# Patient Record
Sex: FEMALE | Race: WHITE | NOT HISPANIC OR LATINO
[De-identification: names, ages, dates, MRNs, and addresses within clinical notes are randomized per-mention and may not be internally consistent; named-entity substitution may affect disease eponyms.]

---

## 2019-09-23 ENCOUNTER — TRANSCRIPTION ENCOUNTER (OUTPATIENT)
Age: 64
End: 2019-09-23

## 2019-09-23 ENCOUNTER — APPOINTMENT (OUTPATIENT)
Dept: CARDIOLOGY | Facility: CLINIC | Age: 64
End: 2019-09-23
Payer: MEDICARE

## 2019-09-23 ENCOUNTER — NON-APPOINTMENT (OUTPATIENT)
Age: 64
End: 2019-09-23

## 2019-09-23 VITALS
WEIGHT: 172 LBS | SYSTOLIC BLOOD PRESSURE: 112 MMHG | HEIGHT: 64 IN | DIASTOLIC BLOOD PRESSURE: 77 MMHG | HEART RATE: 67 BPM | OXYGEN SATURATION: 98 % | BODY MASS INDEX: 29.37 KG/M2

## 2019-09-23 DIAGNOSIS — Z80.0 FAMILY HISTORY OF MALIGNANT NEOPLASM OF DIGESTIVE ORGANS: ICD-10-CM

## 2019-09-23 DIAGNOSIS — E03.9 HYPOTHYROIDISM, UNSPECIFIED: ICD-10-CM

## 2019-09-23 DIAGNOSIS — Z82.49 FAMILY HISTORY OF ISCHEMIC HEART DISEASE AND OTHER DISEASES OF THE CIRCULATORY SYSTEM: ICD-10-CM

## 2019-09-23 DIAGNOSIS — Z80.7 FAMILY HISTORY OF OTHER MALIGNANT NEOPLASMS OF LYMPHOID, HEMATOPOIETIC AND RELATED TISSUES: ICD-10-CM

## 2019-09-23 PROBLEM — Z00.00 ENCOUNTER FOR PREVENTIVE HEALTH EXAMINATION: Status: ACTIVE | Noted: 2019-09-23

## 2019-09-23 PROCEDURE — 99204 OFFICE O/P NEW MOD 45 MIN: CPT

## 2019-09-23 PROCEDURE — 93000 ELECTROCARDIOGRAM COMPLETE: CPT

## 2019-09-23 RX ORDER — TRIAMTERENE AND HYDROCHLOROTHIAZIDE 37.5; 25 MG/1; MG/1
37.5-25 CAPSULE ORAL
Refills: 0 | Status: ACTIVE | COMMUNITY

## 2019-09-23 NOTE — DISCUSSION/SUMMARY
[FreeTextEntry1] : Unfortunately, her blood work is unavailable. I had a long conversation with her about the impact of abnormalities in the following risk factors in determining cardiovascular risk. Her risk is elevated currently on the basis of classic risk factors, so it is not clear that we need to put too much emphasis on the results of the evolving respecter is, that may help us decide how aggressive to be in terms of aspirin therapy. Once I have had a chance to review all of her laboratory results, I will make additional comments regarding therapy. I suggested that it is likely that with uncontrolled LDL cholesterol, we will change her to atorvastatin 40 mg daily, hoping to achieve a better LDL cholesterol.\par \par In an attempt at determining more accurately her overall cardiovascular risk, I suggested the following examinations. Noting that she does not exercise, and as such the lack of symptoms is not reassuring, I have suggested a regular stress test. Hypertension, she will schedule an echocardiogram. To assess her overall burden of atherosclerosis, I have suggested a carotid ultrasound as well as a calcium score. She will schedule these, and I will be in contact with her to discuss the results. As soon as I have a chance to review her blood work results, I will call her and we will make a decision about the merits of more powerful lipid lowering therapy.

## 2019-09-23 NOTE — HISTORY OF PRESENT ILLNESS
[FreeTextEntry1] : Jannet presented to the office today for a cardiovascular evaluation. She presents for further evaluation of her cardiovascular risk factors.\par \par She is a 64-year-old woman with a history of hypertension and hyperlipidemia. There is a family history of early atherosclerosis, noting that she has a brother who had an MI at around the age of 55. She herself does not have any known structural heart disease, but has been undergoing treatment with a diuretic and a statin for years. At baseline, she has been sedentary. With day-to-day activities, she has felt well, without reproducible chest discomfort or shortness of breath that would be suggestive of angina. She denies orthopnea, PND and lower extremity edema. She denies palpitations, dizziness and syncope. She recently had blood work, which is unavailable. In addition to her LDL cholesterol, which has been in perfectly controlled by report, there was another blood test, listed as an emergent risk factor, which was also significantly abnormal. It is for the further evaluation of this, that she presents to the office for evaluation.\par

## 2019-09-28 ENCOUNTER — APPOINTMENT (OUTPATIENT)
Dept: CARDIOLOGY | Facility: CLINIC | Age: 64
End: 2019-09-28
Payer: MEDICARE

## 2019-09-28 PROCEDURE — 93880 EXTRACRANIAL BILAT STUDY: CPT

## 2019-10-10 ENCOUNTER — APPOINTMENT (OUTPATIENT)
Dept: CARDIOLOGY | Facility: CLINIC | Age: 64
End: 2019-10-10
Payer: MEDICARE

## 2019-10-10 PROCEDURE — 93306 TTE W/DOPPLER COMPLETE: CPT

## 2019-10-21 ENCOUNTER — APPOINTMENT (OUTPATIENT)
Dept: CARDIOLOGY | Facility: CLINIC | Age: 64
End: 2019-10-21
Payer: MEDICARE

## 2019-10-21 PROCEDURE — 93015 CV STRESS TEST SUPVJ I&R: CPT

## 2020-01-04 ENCOUNTER — TRANSCRIPTION ENCOUNTER (OUTPATIENT)
Age: 65
End: 2020-01-04

## 2020-10-08 ENCOUNTER — APPOINTMENT (OUTPATIENT)
Dept: OTOLARYNGOLOGY | Facility: CLINIC | Age: 65
End: 2020-10-08
Payer: MEDICARE

## 2020-10-08 VITALS
TEMPERATURE: 97.9 F | HEART RATE: 85 BPM | HEIGHT: 64 IN | WEIGHT: 180 LBS | BODY MASS INDEX: 30.73 KG/M2 | DIASTOLIC BLOOD PRESSURE: 78 MMHG | SYSTOLIC BLOOD PRESSURE: 117 MMHG

## 2020-10-08 DIAGNOSIS — Z86.79 PERSONAL HISTORY OF OTHER DISEASES OF THE CIRCULATORY SYSTEM: ICD-10-CM

## 2020-10-08 DIAGNOSIS — Z86.39 PERSONAL HISTORY OF OTHER ENDOCRINE, NUTRITIONAL AND METABOLIC DISEASE: ICD-10-CM

## 2020-10-08 DIAGNOSIS — Z78.9 OTHER SPECIFIED HEALTH STATUS: ICD-10-CM

## 2020-10-08 DIAGNOSIS — H61.21 IMPACTED CERUMEN, RIGHT EAR: ICD-10-CM

## 2020-10-08 PROCEDURE — 99203 OFFICE O/P NEW LOW 30 MIN: CPT | Mod: 25

## 2020-10-08 PROCEDURE — 69210 REMOVE IMPACTED EAR WAX UNI: CPT

## 2020-10-08 NOTE — HISTORY OF PRESENT ILLNESS
[de-identified] : 65 yr old female was told by her dr that she had CI AD 3-4 weeks ago.  Tried OTC drops, H2O2, still clogged.  +otalgia AD a few days ago, better now.\par +Qtips\par -tinnitus, dizzy\par +childhood otitis\par -noise exp, head trauma\par +FH father +NIHL

## 2020-10-08 NOTE — CONSULT LETTER
[Dear  ___] : Dear  [unfilled], [Please see my note below.] : Please see my note below. [Consult Closing:] : Thank you very much for allowing me to participate in the care of this patient.  If you have any questions, please do not hesitate to contact me. [Sincerely,] : Sincerely, [FreeTextEntry3] : Micah Verma M.D.\par

## 2020-10-08 NOTE — PHYSICAL EXAM
[de-identified] : CI AD [Normal] : mucosa is normal [Midline] : trachea located in midline position

## 2020-10-08 NOTE — REVIEW OF SYSTEMS
[Sneezing] : sneezing [Seasonal Allergies] : seasonal allergies [Post Nasal Drip] : post nasal drip [Ear Pain] : ear pain [Ear Itch] : ear itch [Nasal Congestion] : nasal congestion [Sinus Pain] : sinus pain [Sinus Pressure] : sinus pressure [Discharge From Eyes] : purulent discharge from the eyes [Eyes Itch] : itching of the eyes [Negative] : Heme/Lymph [As Noted in HPI] : as noted in HPI [FreeTextEntry3] : clear

## 2022-06-15 ENCOUNTER — NON-APPOINTMENT (OUTPATIENT)
Age: 67
End: 2022-06-15

## 2022-07-05 ENCOUNTER — NON-APPOINTMENT (OUTPATIENT)
Age: 67
End: 2022-07-05

## 2022-07-05 ENCOUNTER — APPOINTMENT (OUTPATIENT)
Dept: CARDIOLOGY | Facility: CLINIC | Age: 67
End: 2022-07-05

## 2022-07-05 VITALS
OXYGEN SATURATION: 97 % | SYSTOLIC BLOOD PRESSURE: 115 MMHG | DIASTOLIC BLOOD PRESSURE: 74 MMHG | BODY MASS INDEX: 28.68 KG/M2 | HEART RATE: 63 BPM | WEIGHT: 168 LBS | HEIGHT: 64 IN

## 2022-07-05 PROCEDURE — 93000 ELECTROCARDIOGRAM COMPLETE: CPT

## 2022-07-05 PROCEDURE — 99214 OFFICE O/P EST MOD 30 MIN: CPT

## 2022-07-05 RX ORDER — SIMVASTATIN 40 MG/1
40 TABLET, FILM COATED ORAL
Refills: 0 | Status: DISCONTINUED | COMMUNITY
End: 2022-07-05

## 2022-07-05 RX ORDER — LEVOTHYROXINE SODIUM 125 UG/1
125 TABLET ORAL
Qty: 90 | Refills: 0 | Status: ACTIVE | COMMUNITY
Start: 2022-06-23

## 2022-07-05 RX ORDER — LEVOTHYROXINE SODIUM 137 UG/1
137 TABLET ORAL
Refills: 0 | Status: DISCONTINUED | COMMUNITY
End: 2022-07-05

## 2022-07-05 NOTE — DISCUSSION/SUMMARY
[FreeTextEntry1] : Overall, she seems to be a relatively low risk individual.  We had an extensive conversation about the ramifications of her myeloperoxidase level being elevated.  In context, she is a low risk individual, despite her family history.  Her calcium score is 0, and her lipid profile is as measured in a standard fashion is all very well controlled.\par \par I do not like simvastatin 60 mg given the risk of side effects, and I will change her to atorvastatin 40 mg which is a safer approach.  She will have blood work done in about 6 weeks with her primary care physician.\par \par I would like to see her again in about a year to reevaluate her extensive lipid profile.  We will further discuss her overall cardiovascular risk at that time.

## 2022-07-05 NOTE — HISTORY OF PRESENT ILLNESS
[FreeTextEntry1] : Jannet presented to the office today for a cardiovascular evaluation.  She was last seen in the office in September 2019, for the further evaluation of her risk factors.\par \par She is now 67 years old, with a history of hypertension and hyperlipidemia. There is a family history of early atherosclerosis, noting that she has a brother who had an MI at around the age of 55. She herself does not have any known structural heart disease, but has been undergoing treatment with a diuretic and a statin for years. \par \par I saw her in the office in 2019, at which time she was feeling well.  She was concerned about her overall cardiovascular risk, and we elected to pursue several noninvasive evaluations.  She had a carotid ultrasound, an echocardiogram and a stress test.  The results of these were all favorable.  Her carotid ultrasound revealed minimal atherosclerotic changes.  Her echocardiogram revealed a normal ejection fraction, without significant valvular disease.  Her stress test revealed no evidence of ischemia.  For further evaluation she also went on to have a coronary calcium score in October 2019.  Her calcium score was 0.  She was reassured.\par \par At baseline, she has remained sedentary. With day-to-day activities, she has felt well, without reproducible chest discomfort or shortness of breath that would be suggestive of angina. She denies orthopnea, PND and lower extremity edema. She denies palpitations, dizziness and syncope.  She has an extensive lipid profile including involving risk factor levels.  This revealed that all of her levels were well controlled, other than myeloperoxidase, which has been historically elevated for her.  On the basis of this, she was told to increase her dose of simvastatin from 40 mg up to 60 mg daily.

## 2022-07-28 ENCOUNTER — APPOINTMENT (OUTPATIENT)
Dept: OTOLARYNGOLOGY | Facility: CLINIC | Age: 67
End: 2022-07-28

## 2022-07-28 VITALS — HEIGHT: 64 IN | BODY MASS INDEX: 28.68 KG/M2 | WEIGHT: 168 LBS

## 2022-07-28 VITALS — HEART RATE: 60 BPM | SYSTOLIC BLOOD PRESSURE: 140 MMHG | DIASTOLIC BLOOD PRESSURE: 88 MMHG

## 2022-07-28 VITALS
BODY MASS INDEX: 28.88 KG/M2 | DIASTOLIC BLOOD PRESSURE: 86 MMHG | SYSTOLIC BLOOD PRESSURE: 136 MMHG | HEART RATE: 54 BPM | HEIGHT: 63 IN | WEIGHT: 163 LBS

## 2022-07-28 DIAGNOSIS — H93.291 OTHER ABNORMAL AUDITORY PERCEPTIONS, RIGHT EAR: ICD-10-CM

## 2022-07-28 PROCEDURE — 92570 ACOUSTIC IMMITANCE TESTING: CPT

## 2022-07-28 PROCEDURE — 99214 OFFICE O/P EST MOD 30 MIN: CPT

## 2022-07-28 PROCEDURE — 92557 COMPREHENSIVE HEARING TEST: CPT

## 2022-07-28 NOTE — DATA REVIEWED
[de-identified] : \par -TYMPS: TYPE C AD, TYPE A AS\par -AD: HEARING -2000 HZ, ESSENTIALLY MILD CHL 5307-4965 HZ\par -AS: HEARING ESSENTIALLY WITHIN/ BORDERING NORMAL LIMITS 250-8000 HZ, MILD  SNHL NOTED AT 3KHZ\par RECS: 1) ENT F/U 2)RE-EVAL IN CONJUNCTION W/ MEDICAL MANAGEMENT

## 2022-07-28 NOTE — HISTORY OF PRESENT ILLNESS
[de-identified] : 67 yr old female failed screening audio AD at her internist, aware of some loss AD\par -tinnitus, dizzy\par \par +hx otitis, FH NIHL\par -noise exp, head trauma

## 2022-07-28 NOTE — REVIEW OF SYSTEMS
[Seasonal Allergies] : seasonal allergies [As Noted in HPI] : as noted in HPI [Negative] : Head and Neck Airway patent, nasal congestion, mouth with moist mucosa. Throat has no vesicles, no oropharyngeal exudates and uvula is midline. Clear tympanic membranes bilaterally. Airway patent, +nasal congestion, mouth with moist mucosa. Throat has no vesicles, no oropharyngeal exudates and uvula is midline. Clear tympanic membranes bilaterally.

## 2022-07-28 NOTE — ASSESSMENT
[FreeTextEntry1] :   AS WNL w mild SNHL 3KHZ w type A, AD WNL to mild SNHL 3-8KHz 2 type C\par ABR\par f/u after testing is complete

## 2022-08-02 ENCOUNTER — NON-APPOINTMENT (OUTPATIENT)
Age: 67
End: 2022-08-02

## 2022-08-02 ENCOUNTER — APPOINTMENT (OUTPATIENT)
Dept: OTOLARYNGOLOGY | Facility: CLINIC | Age: 67
End: 2022-08-02

## 2022-08-02 PROCEDURE — 92653 AEP NEURODIAGNOSTIC I&R: CPT

## 2022-08-04 ENCOUNTER — APPOINTMENT (OUTPATIENT)
Dept: OTOLARYNGOLOGY | Facility: CLINIC | Age: 67
End: 2022-08-04

## 2022-08-04 VITALS
HEIGHT: 63 IN | HEART RATE: 58 BPM | SYSTOLIC BLOOD PRESSURE: 110 MMHG | DIASTOLIC BLOOD PRESSURE: 68 MMHG | WEIGHT: 163 LBS | BODY MASS INDEX: 28.88 KG/M2

## 2022-08-04 PROCEDURE — 99213 OFFICE O/P EST LOW 20 MIN: CPT

## 2022-08-04 NOTE — HISTORY OF PRESENT ILLNESS
[de-identified] : 67 yr old female failed screening audio AD at her internist, aware of some loss AD\par -tinnitus, dizzy\par \par +hx otitis, FH NIHL\par -noise exp, head trauma\par \par comes in today for results of ABR

## 2022-11-07 ENCOUNTER — APPOINTMENT (OUTPATIENT)
Dept: OTOLARYNGOLOGY | Facility: CLINIC | Age: 67
End: 2022-11-07

## 2022-11-07 VITALS
SYSTOLIC BLOOD PRESSURE: 131 MMHG | HEART RATE: 58 BPM | BODY MASS INDEX: 29.77 KG/M2 | WEIGHT: 168 LBS | HEIGHT: 63 IN | DIASTOLIC BLOOD PRESSURE: 85 MMHG

## 2022-11-07 DIAGNOSIS — H69.81 OTHER SPECIFIED DISORDERS OF EUSTACHIAN TUBE, RIGHT EAR: ICD-10-CM

## 2022-11-07 PROCEDURE — 99213 OFFICE O/P EST LOW 20 MIN: CPT

## 2022-11-07 PROCEDURE — 92570 ACOUSTIC IMMITANCE TESTING: CPT

## 2022-11-07 PROCEDURE — 92557 COMPREHENSIVE HEARING TEST: CPT

## 2022-11-07 RX ORDER — AZITHROMYCIN 250 MG/1
250 TABLET, FILM COATED ORAL
Qty: 6 | Refills: 0 | Status: DISCONTINUED | COMMUNITY
Start: 2022-08-02

## 2022-11-07 NOTE — DATA REVIEWED
[de-identified] : Type C tymp AD, type A AS\par AD- WNL to mild -8000 Hz, AS- Borderline -8000 Hz\par

## 2022-11-07 NOTE — ASSESSMENT
[FreeTextEntry1] :   AD WNL to mild MHL 2-8KHz w type A, AS borderline WNL.  Stable as compared to 2022\par f/u 6 months

## 2022-11-21 ENCOUNTER — OUTPATIENT (OUTPATIENT)
Dept: OUTPATIENT SERVICES | Facility: HOSPITAL | Age: 67
LOS: 1 days | End: 2022-11-21
Payer: MEDICARE

## 2022-11-21 ENCOUNTER — APPOINTMENT (OUTPATIENT)
Dept: CT IMAGING | Facility: CLINIC | Age: 67
End: 2022-11-21

## 2022-11-21 DIAGNOSIS — Z00.8 ENCOUNTER FOR OTHER GENERAL EXAMINATION: ICD-10-CM

## 2022-11-21 PROCEDURE — 74177 CT ABD & PELVIS W/CONTRAST: CPT | Mod: MH

## 2022-11-21 PROCEDURE — 74177 CT ABD & PELVIS W/CONTRAST: CPT | Mod: 26,MH

## 2022-12-08 ENCOUNTER — APPOINTMENT (OUTPATIENT)
Dept: MRI IMAGING | Facility: CLINIC | Age: 67
End: 2022-12-08

## 2022-12-08 ENCOUNTER — OUTPATIENT (OUTPATIENT)
Dept: OUTPATIENT SERVICES | Facility: HOSPITAL | Age: 67
LOS: 1 days | End: 2022-12-08
Payer: MEDICARE

## 2022-12-08 DIAGNOSIS — Z00.8 ENCOUNTER FOR OTHER GENERAL EXAMINATION: ICD-10-CM

## 2022-12-08 PROCEDURE — 74183 MRI ABD W/O CNTR FLWD CNTR: CPT | Mod: 26,MH

## 2022-12-08 PROCEDURE — 72197 MRI PELVIS W/O & W/DYE: CPT | Mod: 26,MH

## 2022-12-08 PROCEDURE — 74183 MRI ABD W/O CNTR FLWD CNTR: CPT | Mod: MH

## 2022-12-08 PROCEDURE — A9585: CPT

## 2022-12-08 PROCEDURE — 72197 MRI PELVIS W/O & W/DYE: CPT | Mod: MH

## 2023-03-16 ENCOUNTER — NON-APPOINTMENT (OUTPATIENT)
Age: 68
End: 2023-03-16

## 2023-03-16 RX ORDER — ATORVASTATIN CALCIUM 40 MG/1
40 TABLET, FILM COATED ORAL
Qty: 90 | Refills: 3 | Status: DISCONTINUED | COMMUNITY
Start: 2022-07-05 | End: 2023-03-16

## 2023-03-30 LAB
ALBUMIN SERPL ELPH-MCNC: 4.2 G/DL
ALP BLD-CCNC: 102 U/L
ALT SERPL-CCNC: 55 U/L
ANION GAP SERPL CALC-SCNC: 11 MMOL/L
AST SERPL-CCNC: 36 U/L
BILIRUB SERPL-MCNC: 0.5 MG/DL
BUN SERPL-MCNC: 12 MG/DL
CALCIUM SERPL-MCNC: 9.7 MG/DL
CHLORIDE SERPL-SCNC: 103 MMOL/L
CO2 SERPL-SCNC: 30 MMOL/L
CREAT SERPL-MCNC: 0.75 MG/DL
EGFR: 86 ML/MIN/1.73M2
GLUCOSE SERPL-MCNC: 94 MG/DL
POTASSIUM SERPL-SCNC: 4 MMOL/L
PROT SERPL-MCNC: 6.3 G/DL
SODIUM SERPL-SCNC: 144 MMOL/L

## 2023-04-20 ENCOUNTER — APPOINTMENT (OUTPATIENT)
Dept: CARDIOLOGY | Facility: CLINIC | Age: 68
End: 2023-04-20
Payer: MEDICARE

## 2023-04-20 ENCOUNTER — NON-APPOINTMENT (OUTPATIENT)
Age: 68
End: 2023-04-20

## 2023-04-20 VITALS
HEIGHT: 63 IN | SYSTOLIC BLOOD PRESSURE: 122 MMHG | HEART RATE: 58 BPM | DIASTOLIC BLOOD PRESSURE: 71 MMHG | WEIGHT: 168 LBS | OXYGEN SATURATION: 99 % | BODY MASS INDEX: 29.77 KG/M2

## 2023-04-20 DIAGNOSIS — R79.89 OTHER SPECIFIED ABNORMAL FINDINGS OF BLOOD CHEMISTRY: ICD-10-CM

## 2023-04-20 DIAGNOSIS — I10 ESSENTIAL (PRIMARY) HYPERTENSION: ICD-10-CM

## 2023-04-20 DIAGNOSIS — E78.49 OTHER HYPERLIPIDEMIA: ICD-10-CM

## 2023-04-20 PROCEDURE — 99214 OFFICE O/P EST MOD 30 MIN: CPT

## 2023-04-20 PROCEDURE — 93000 ELECTROCARDIOGRAM COMPLETE: CPT

## 2023-04-20 NOTE — REASON FOR VISIT
[Hyperlipidemia] : hyperlipidemia [Consultation] : a consultation regarding [Abnormal Test Result] : an abnormal test result [Hypertension] : hypertension

## 2023-04-22 NOTE — ADDENDUM
[FreeTextEntry1] : lfts had increased on atorva, better off meds (since mid march)\par will plan to repeat lfts one month and then resume simva\par

## 2023-04-22 NOTE — PHYSICAL EXAM
[General Appearance - Well Developed] : well developed [Normal Appearance] : normal appearance [Well Groomed] : well groomed [General Appearance - Well Nourished] : well nourished [No Deformities] : no deformities [General Appearance - In No Acute Distress] : no acute distress [Eyelids - No Xanthelasma] : the eyelids demonstrated no xanthelasmas [Normal Oral Mucosa] : normal oral mucosa [No Oral Pallor] : no oral pallor [No Oral Cyanosis] : no oral cyanosis [Normal Jugular Venous A Waves Present] : normal jugular venous A waves present [Normal Jugular Venous V Waves Present] : normal jugular venous V waves present [No Jugular Venous Caldera A Waves] : no jugular venous caldera A waves [Heart Rate And Rhythm] : heart rate and rhythm were normal [Heart Sounds] : normal S1 and S2 [Murmurs] : no murmurs present [Respiration, Rhythm And Depth] : normal respiratory rhythm and effort [Exaggerated Use Of Accessory Muscles For Inspiration] : no accessory muscle use [Auscultation Breath Sounds / Voice Sounds] : lungs were clear to auscultation bilaterally [Abdomen Soft] : soft [Abdomen Tenderness] : non-tender [Abdomen Mass (___ Cm)] : no abdominal mass palpated [Abnormal Walk] : normal gait [Gait - Sufficient For Exercise Testing] : the gait was sufficient for exercise testing [Nail Clubbing] : no clubbing of the fingernails [Cyanosis, Localized] : no localized cyanosis [Petechial Hemorrhages (___cm)] : no petechial hemorrhages [Skin Color & Pigmentation] : normal skin color and pigmentation [] : no rash [No Venous Stasis] : no venous stasis [Skin Lesions] : no skin lesions [No Skin Ulcers] : no skin ulcer [No Xanthoma] : no  xanthoma was observed [Oriented To Time, Place, And Person] : oriented to person, place, and time [Affect] : the affect was normal [Mood] : the mood was normal [No Anxiety] : not feeling anxious [Well Developed] : well developed [Well Nourished] : well nourished [No Acute Distress] : no acute distress [Normal Conjunctiva] : normal conjunctiva [Normal Venous Pressure] : normal venous pressure [No Carotid Bruit] : no carotid bruit [Normal S1, S2] : normal S1, S2 [No Rub] : no rub [No Gallop] : no gallop [Rhythm Regular] : regular [Normal S1] : normal S1 [Normal S2] : normal S2 [No Murmur] : no murmurs heard [No Pitting Edema] : no pitting edema present [No Abnormalities] : the abdominal aorta was not enlarged and no bruit was heard [Clear Lung Fields] : clear lung fields [Good Air Entry] : good air entry [No Respiratory Distress] : no respiratory distress  [Soft] : abdomen soft [Non Tender] : non-tender [No Masses/organomegaly] : no masses/organomegaly [Normal Bowel Sounds] : normal bowel sounds [Normal Gait] : normal gait [No Edema] : no edema [No Cyanosis] : no cyanosis [No Clubbing] : no clubbing [No Varicosities] : no varicosities [No Rash] : no rash [No Skin Lesions] : no skin lesions [Moves all extremities] : moves all extremities [No Focal Deficits] : no focal deficits [Normal Speech] : normal speech [Alert and Oriented] : alert and oriented [Normal memory] : normal memory [Right Carotid Bruit] : no bruit heard over the right carotid [Left Carotid Bruit] : no bruit heard over the left carotid

## 2023-04-22 NOTE — HISTORY OF PRESENT ILLNESS
[FreeTextEntry1] : Jannet presented to the office today for a cardiovascular evaluation.  \par \par She is now 68 years old, with a history of hypertension and hyperlipidemia. There is a family history of early atherosclerosis, noting that she has a brother who had an MI at around the age of 55. She herself does not have any known structural heart disease, but has been undergoing treatment with a diuretic and a statin for years. \par \par I saw her in the office in 2019, at which time she was feeling well.  She was concerned about her overall cardiovascular risk, and we elected to pursue several noninvasive evaluations.  She had a carotid ultrasound, an echocardiogram and a stress test.  The results of these were all favorable.  Her carotid ultrasound revealed minimal atherosclerotic changes.  Her echocardiogram revealed a normal ejection fraction, without significant valvular disease.  Her stress test revealed no evidence of ischemia.  For further evaluation she also went on to have a coronary calcium score in October 2019.  Her calcium score was 0.  She was reassured.\par \par At baseline, she has remained sedentary. With day-to-day activities, she has felt well, without reproducible chest discomfort or shortness of breath that would be suggestive of angina. She denies orthopnea, PND and lower extremity edema. She denies palpitations, dizziness and syncope.  She has an extensive lipid profile including involving risk factor levels.  This revealed that all of her levels were well controlled, other than myeloperoxidase, which has been historically elevated for her.  On the basis of this, she was told to increase her dose of simvastatin from 40 mg up to 60 mg daily, however, due to recommendations against simvastatin doses above 40mg , She was  switched to atorvastatin 40mg. \par Follow up Blood work demonstrated increase in LFTs, , .  Atorvastatin was then discontinued for one month now. Repeat blood LFTS normalized  - AST 36, ALT 55.\par She denies any symptoms of muscle aches or pains.\par She denies fever, chills n/v/d.

## 2023-04-22 NOTE — DISCUSSION/SUMMARY
[EKG obtained to assist in diagnosis and management of assessed problem(s)] : EKG obtained to assist in diagnosis and management of assessed problem(s) [FreeTextEntry1] : Overall, she seems to be a relatively low risk individual.  We had an extensive conversation about the ramifications of her myeloperoxidase level being elevated.  In context, she is a low risk individual, despite her family history.  Her calcium score is 0, and her lipid profile is as measured in a standard fashion is all very well controlled.\par Cardiac work-up performed in 2019 including echocardiogram and stress testing were overall unremarkable.\par \par She arrives today feeling well.  No acute distress.  Euvolemic on exam.  ECG illustrates sinus bradycardia.  Her blood pressure is controlled.\par \par Based on her most recent blood work her LFTs have normalized.  I have advised she remain off of all statin therapy for another month and repeat LFTs at that time.  If completely normal she will go back on simvastatin 40 mg, for she had tolerated that without issues.  \par Her most recent LDL was at goal of 90.\par \par Her blood pressure is controlled, she will continue triamterene 37.5/25 mg daily.\par \par \par I will call her with the results of her blood work in a month.\par \par She can follow-up again with me in 6 months, sooner if questions or concerns arise.  She also may be undergoing breast reduction surgery in September.  She knows to call the office to arrange for cardiac clearance.

## 2023-04-22 NOTE — CARDIOLOGY SUMMARY
[___] : [unfilled] [de-identified] : sinus monserrat 59 bpm [de-identified] : Exercise\par 2019\par 10 METS [de-identified] : 2019\par Normal LV/RV\par EF 62%\par Minimal MR [de-identified] : 2019\par Mild atherosclerosis

## 2023-05-08 ENCOUNTER — APPOINTMENT (OUTPATIENT)
Dept: OTOLARYNGOLOGY | Facility: CLINIC | Age: 68
End: 2023-05-08

## 2023-06-02 LAB
ALBUMIN SERPL ELPH-MCNC: 4 G/DL
ALP BLD-CCNC: 85 U/L
ALT SERPL-CCNC: 15 U/L
ANION GAP SERPL CALC-SCNC: 9 MMOL/L
AST SERPL-CCNC: 16 U/L
BILIRUB SERPL-MCNC: 0.4 MG/DL
BUN SERPL-MCNC: 13 MG/DL
CALCIUM SERPL-MCNC: 9.6 MG/DL
CHLORIDE SERPL-SCNC: 105 MMOL/L
CHOLEST SERPL-MCNC: 272 MG/DL
CO2 SERPL-SCNC: 29 MMOL/L
CREAT SERPL-MCNC: 0.75 MG/DL
EGFR: 86 ML/MIN/1.73M2
GLUCOSE SERPL-MCNC: 95 MG/DL
HDLC SERPL-MCNC: 74 MG/DL
LDLC SERPL CALC-MCNC: 184 MG/DL
NONHDLC SERPL-MCNC: 199 MG/DL
POTASSIUM SERPL-SCNC: 4 MMOL/L
PROT SERPL-MCNC: 6.6 G/DL
SODIUM SERPL-SCNC: 144 MMOL/L
TRIGL SERPL-MCNC: 73 MG/DL

## 2023-06-02 RX ORDER — SIMVASTATIN 40 MG/1
40 TABLET, FILM COATED ORAL DAILY
Qty: 90 | Refills: 0 | Status: ACTIVE | COMMUNITY
Start: 2023-06-02

## 2023-06-12 ENCOUNTER — APPOINTMENT (OUTPATIENT)
Dept: OTOLARYNGOLOGY | Facility: CLINIC | Age: 68
End: 2023-06-12
Payer: MEDICARE

## 2023-06-12 VITALS
SYSTOLIC BLOOD PRESSURE: 143 MMHG | HEART RATE: 65 BPM | WEIGHT: 164 LBS | DIASTOLIC BLOOD PRESSURE: 96 MMHG | BODY MASS INDEX: 29.06 KG/M2 | HEIGHT: 63 IN

## 2023-06-12 DIAGNOSIS — J06.9 ACUTE UPPER RESPIRATORY INFECTION, UNSPECIFIED: ICD-10-CM

## 2023-06-12 DIAGNOSIS — R05.9 COUGH, UNSPECIFIED: ICD-10-CM

## 2023-06-12 DIAGNOSIS — H90.71 MIXED CONDUCTIVE AND SENSORINEURAL HEARING LOSS, UNILATERAL, RIGHT EAR, WITH UNRESTRICTED HEARING ON THE CONTRALATERAL SIDE: ICD-10-CM

## 2023-06-12 PROCEDURE — 99213 OFFICE O/P EST LOW 20 MIN: CPT

## 2023-06-12 NOTE — HISTORY OF PRESENT ILLNESS
[de-identified] : 68 yr old female failed screening audio AD at her internist 7/2022  aware of some loss AD.\par +asymmetrical SNHL AD worse\par ABR WNL 8/2022\par -tinnitus, dizzy\par \par +hx otitis, FH NIHL\par -noise exp, head trauma\par \par \par c/o URI with sore throat and cough for 2 days\par occ pain AD\par -fever\par +discolored mucous\par

## 2023-06-12 NOTE — ASSESSMENT
[FreeTextEntry1] : rx Augmentin and flonase (has at home) for URI\par \par f/u 2-3 weeks when well for audio

## 2023-06-12 NOTE — PHYSICAL EXAM
[Normal] : mucosa is normal [Midline] : trachea located in midline position [de-identified] : erythema [de-identified] : yellow

## 2023-07-03 LAB
ALBUMIN SERPL ELPH-MCNC: 4.1 G/DL
ALP BLD-CCNC: 79 U/L
ALT SERPL-CCNC: 24 U/L
ANION GAP SERPL CALC-SCNC: 9 MMOL/L
AST SERPL-CCNC: 24 U/L
BILIRUB SERPL-MCNC: 0.6 MG/DL
BUN SERPL-MCNC: 13 MG/DL
CALCIUM SERPL-MCNC: 9.2 MG/DL
CHLORIDE SERPL-SCNC: 107 MMOL/L
CHOLEST SERPL-MCNC: 209 MG/DL
CO2 SERPL-SCNC: 28 MMOL/L
CREAT SERPL-MCNC: 0.79 MG/DL
EGFR: 80 ML/MIN/1.73M2
GLUCOSE SERPL-MCNC: 86 MG/DL
HDLC SERPL-MCNC: 75 MG/DL
LDLC SERPL CALC-MCNC: 122 MG/DL
NONHDLC SERPL-MCNC: 134 MG/DL
POTASSIUM SERPL-SCNC: 3.7 MMOL/L
PROT SERPL-MCNC: 6.6 G/DL
SODIUM SERPL-SCNC: 144 MMOL/L
TRIGL SERPL-MCNC: 61 MG/DL

## 2023-07-06 ENCOUNTER — APPOINTMENT (OUTPATIENT)
Dept: OTOLARYNGOLOGY | Facility: CLINIC | Age: 68
End: 2023-07-06
Payer: MEDICARE

## 2023-07-06 VITALS
SYSTOLIC BLOOD PRESSURE: 117 MMHG | WEIGHT: 164 LBS | BODY MASS INDEX: 29.06 KG/M2 | HEIGHT: 63 IN | DIASTOLIC BLOOD PRESSURE: 68 MMHG | HEART RATE: 64 BPM

## 2023-07-06 DIAGNOSIS — H90.3 SENSORINEURAL HEARING LOSS, BILATERAL: ICD-10-CM

## 2023-07-06 PROCEDURE — 92567 TYMPANOMETRY: CPT

## 2023-07-06 PROCEDURE — 92557 COMPREHENSIVE HEARING TEST: CPT

## 2023-07-06 PROCEDURE — 99213 OFFICE O/P EST LOW 20 MIN: CPT

## 2023-07-06 RX ORDER — AMOXICILLIN AND CLAVULANATE POTASSIUM 875; 125 MG/1; MG/1
875-125 TABLET, COATED ORAL
Qty: 20 | Refills: 0 | Status: DISCONTINUED | COMMUNITY
Start: 2023-06-12 | End: 2023-07-06

## 2023-07-06 NOTE — REVIEW OF SYSTEMS
Pending emsg for pain med refill [Seasonal Allergies] : seasonal allergies [As Noted in HPI] : as noted in HPI [Negative] : Head and Neck

## 2023-07-06 NOTE — HISTORY OF PRESENT ILLNESS
[de-identified] : 68 yr old female failed screening audio AD at her internist 7/2022  aware of some loss AD. Not aware of any recent change\par +asymmetrical SNHL AD worse\par ABR WNL 8/2022\par -tinnitus, dizzy\par \par +hx otitis, FH NIHL\par -noise exp, head trauma

## 2023-07-06 NOTE — ASSESSMENT
[FreeTextEntry1] :   AS WNL w  mild SNHL dip at 3KHZ w type A, AD mild to mod MHL w type C\par annual audio

## 2023-07-06 NOTE — DATA REVIEWED
[de-identified] : TYMPS: RIGHT: TYPE C; LEFT: TYPE A\par RIGHT: MILD TO MODERATE MIXED -8KHZ\par LEFT: -8KHZ W/ MILD SNHL DIP NOTED AT 3KHZ

## 2024-09-17 ENCOUNTER — NON-APPOINTMENT (OUTPATIENT)
Age: 69
End: 2024-09-17

## 2025-06-21 ENCOUNTER — NON-APPOINTMENT (OUTPATIENT)
Age: 70
End: 2025-06-21

## 2025-06-23 ENCOUNTER — APPOINTMENT (OUTPATIENT)
Dept: OTOLARYNGOLOGY | Facility: CLINIC | Age: 70
End: 2025-06-23
Payer: MEDICARE

## 2025-06-23 VITALS
WEIGHT: 183 LBS | SYSTOLIC BLOOD PRESSURE: 124 MMHG | HEART RATE: 73 BPM | DIASTOLIC BLOOD PRESSURE: 85 MMHG | HEIGHT: 63 IN | BODY MASS INDEX: 32.43 KG/M2

## 2025-06-23 PROBLEM — H90.A22 SENSORINEURAL HEARING LOSS (SNHL) OF LEFT EAR WITH RESTRICTED HEARING OF RIGHT EAR: Status: ACTIVE | Noted: 2025-06-23

## 2025-06-23 PROCEDURE — 99213 OFFICE O/P EST LOW 20 MIN: CPT | Mod: 25

## 2025-06-23 PROCEDURE — 92557 COMPREHENSIVE HEARING TEST: CPT

## 2025-06-23 PROCEDURE — 92567 TYMPANOMETRY: CPT

## 2025-06-23 PROCEDURE — G0268 REMOVAL OF IMPACTED WAX MD: CPT

## 2025-08-28 ENCOUNTER — RESULT REVIEW (OUTPATIENT)
Age: 70
End: 2025-08-28

## 2025-09-10 ENCOUNTER — NON-APPOINTMENT (OUTPATIENT)
Age: 70
End: 2025-09-10

## 2025-09-11 ENCOUNTER — APPOINTMENT (OUTPATIENT)
Dept: CARDIOLOGY | Facility: CLINIC | Age: 70
End: 2025-09-11
Payer: MEDICARE

## 2025-09-11 VITALS
WEIGHT: 184 LBS | OXYGEN SATURATION: 99 % | HEIGHT: 63 IN | SYSTOLIC BLOOD PRESSURE: 115 MMHG | HEART RATE: 72 BPM | BODY MASS INDEX: 32.6 KG/M2 | DIASTOLIC BLOOD PRESSURE: 73 MMHG

## 2025-09-11 DIAGNOSIS — I49.3 VENTRICULAR PREMATURE DEPOLARIZATION: ICD-10-CM

## 2025-09-11 PROCEDURE — 99214 OFFICE O/P EST MOD 30 MIN: CPT

## 2025-09-11 PROCEDURE — 93000 ELECTROCARDIOGRAM COMPLETE: CPT

## 2025-09-11 RX ORDER — EZETIMIBE AND SIMVASTATIN 10; 40 MG/1; MG/1
10-40 TABLET ORAL
Refills: 0 | Status: ACTIVE | COMMUNITY

## 2025-09-11 RX ORDER — UBIDECARENONE/VIT E ACET 100MG-5
50 MCG CAPSULE ORAL
Refills: 0 | Status: ACTIVE | COMMUNITY

## 2025-09-11 RX ORDER — LANCETS 30 GAUGE
EACH MISCELLANEOUS
Refills: 0 | Status: ACTIVE | COMMUNITY

## 2025-09-11 RX ORDER — RIBOFLAVIN (VITAMIN B2) 100 MG
100 TABLET ORAL
Refills: 0 | Status: ACTIVE | COMMUNITY